# Patient Record
Sex: MALE | HISPANIC OR LATINO | Employment: FULL TIME | URBAN - METROPOLITAN AREA
[De-identification: names, ages, dates, MRNs, and addresses within clinical notes are randomized per-mention and may not be internally consistent; named-entity substitution may affect disease eponyms.]

---

## 2019-01-21 ENCOUNTER — APPOINTMENT (OUTPATIENT)
Dept: RADIOLOGY | Facility: MEDICAL CENTER | Age: 44
End: 2019-01-21
Attending: EMERGENCY MEDICINE
Payer: COMMERCIAL

## 2019-01-21 ENCOUNTER — HOSPITAL ENCOUNTER (EMERGENCY)
Facility: MEDICAL CENTER | Age: 44
End: 2019-01-21
Attending: EMERGENCY MEDICINE
Payer: COMMERCIAL

## 2019-01-21 VITALS
OXYGEN SATURATION: 98 % | TEMPERATURE: 97.9 F | DIASTOLIC BLOOD PRESSURE: 88 MMHG | BODY MASS INDEX: 27.4 KG/M2 | WEIGHT: 174.6 LBS | HEIGHT: 67 IN | RESPIRATION RATE: 16 BRPM | SYSTOLIC BLOOD PRESSURE: 136 MMHG | HEART RATE: 88 BPM

## 2019-01-21 DIAGNOSIS — S09.90XA CLOSED HEAD INJURY, INITIAL ENCOUNTER: ICD-10-CM

## 2019-01-21 DIAGNOSIS — S20.212S: ICD-10-CM
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
## 2019-01-21 LAB
ALBUMIN SERPL BCP-MCNC: 5.2 G/DL (ref 3.2–4.9)
ALBUMIN/GLOB SERPL: 1.7 G/DL
ALP SERPL-CCNC: 76 U/L (ref 30–99)
ALT SERPL-CCNC: 29 U/L (ref 2–50)
ANION GAP SERPL CALC-SCNC: 9 MMOL/L (ref 0–11.9)
AST SERPL-CCNC: 21 U/L (ref 12–45)
BASOPHILS # BLD AUTO: 0.6 % (ref 0–1.8)
BASOPHILS # BLD: 0.05 K/UL (ref 0–0.12)
BILIRUB SERPL-MCNC: 0.7 MG/DL (ref 0.1–1.5)
BUN SERPL-MCNC: 15 MG/DL (ref 8–22)
CALCIUM SERPL-MCNC: 9.8 MG/DL (ref 8.5–10.5)
CHLORIDE SERPL-SCNC: 105 MMOL/L (ref 96–112)
CO2 SERPL-SCNC: 26 MMOL/L (ref 20–33)
CREAT SERPL-MCNC: 1 MG/DL (ref 0.5–1.4)
EOSINOPHIL # BLD AUTO: 0.08 K/UL (ref 0–0.51)
EOSINOPHIL NFR BLD: 1 % (ref 0–6.9)
ERYTHROCYTE [DISTWIDTH] IN BLOOD BY AUTOMATED COUNT: 39.5 FL (ref 35.9–50)
GLOBULIN SER CALC-MCNC: 3 G/DL (ref 1.9–3.5)
GLUCOSE SERPL-MCNC: 86 MG/DL (ref 65–99)
HCT VFR BLD AUTO: 48.9 % (ref 42–52)
HGB BLD-MCNC: 17.1 G/DL (ref 14–18)
IMM GRANULOCYTES # BLD AUTO: 0.01 K/UL (ref 0–0.11)
IMM GRANULOCYTES NFR BLD AUTO: 0.1 % (ref 0–0.9)
LYMPHOCYTES # BLD AUTO: 2.01 K/UL (ref 1–4.8)
LYMPHOCYTES NFR BLD: 24 % (ref 22–41)
MCH RBC QN AUTO: 31.3 PG (ref 27–33)
MCHC RBC AUTO-ENTMCNC: 35 G/DL (ref 33.7–35.3)
MCV RBC AUTO: 89.4 FL (ref 81.4–97.8)
MONOCYTES # BLD AUTO: 0.56 K/UL (ref 0–0.85)
MONOCYTES NFR BLD AUTO: 6.7 % (ref 0–13.4)
NEUTROPHILS # BLD AUTO: 5.66 K/UL (ref 1.82–7.42)
NEUTROPHILS NFR BLD: 67.6 % (ref 44–72)
NRBC # BLD AUTO: 0 K/UL
NRBC BLD-RTO: 0 /100 WBC
PLATELET # BLD AUTO: 204 K/UL (ref 164–446)
PMV BLD AUTO: 11.1 FL (ref 9–12.9)
POTASSIUM SERPL-SCNC: 3.4 MMOL/L (ref 3.6–5.5)
PROT SERPL-MCNC: 8.2 G/DL (ref 6–8.2)
RBC # BLD AUTO: 5.47 M/UL (ref 4.7–6.1)
SODIUM SERPL-SCNC: 140 MMOL/L (ref 135–145)
WBC # BLD AUTO: 8.4 K/UL (ref 4.8–10.8)

## 2019-01-21 PROCEDURE — 36415 COLL VENOUS BLD VENIPUNCTURE: CPT

## 2019-01-21 PROCEDURE — 99285 EMERGENCY DEPT VISIT HI MDM: CPT

## 2019-01-21 PROCEDURE — 70450 CT HEAD/BRAIN W/O DYE: CPT

## 2019-01-21 PROCEDURE — 74177 CT ABD & PELVIS W/CONTRAST: CPT

## 2019-01-21 PROCEDURE — 71101 X-RAY EXAM UNILAT RIBS/CHEST: CPT | Mod: LT

## 2019-01-21 PROCEDURE — 700117 HCHG RX CONTRAST REV CODE 255: Performed by: EMERGENCY MEDICINE

## 2019-01-21 PROCEDURE — 80053 COMPREHEN METABOLIC PANEL: CPT

## 2019-01-21 PROCEDURE — 85025 COMPLETE CBC W/AUTO DIFF WBC: CPT

## 2019-01-21 RX ORDER — NAPROXEN SODIUM 275 MG/1
275 TABLET ORAL
Qty: 20 TAB | Refills: 1 | Status: SHIPPED | OUTPATIENT
Start: 2019-01-21 | End: 2023-11-02

## 2019-01-21 RX ORDER — CYCLOBENZAPRINE HCL 10 MG
10 TABLET ORAL 3 TIMES DAILY PRN
Qty: 30 TAB | Refills: 0 | Status: SHIPPED | OUTPATIENT
Start: 2019-01-21 | End: 2023-11-02

## 2019-01-21 RX ADMIN — IOHEXOL 100 ML: 350 INJECTION, SOLUTION INTRAVENOUS at 15:23
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
## 2019-01-22 NOTE — ED NOTES
Pt verbalizes understanding of discharge and follow-up instructions.  PIV removed.  Given Rx X2.  VSS.  All questions answered.  Ambulates to discharge with steady gait.

## 2023-11-02 ENCOUNTER — OFFICE VISIT (OUTPATIENT)
Dept: MEDICAL GROUP | Facility: CLINIC | Age: 48
End: 2023-11-02

## 2023-11-02 VITALS
BODY MASS INDEX: 26.36 KG/M2 | RESPIRATION RATE: 16 BRPM | WEIGHT: 178 LBS | DIASTOLIC BLOOD PRESSURE: 90 MMHG | HEIGHT: 69 IN | SYSTOLIC BLOOD PRESSURE: 136 MMHG | OXYGEN SATURATION: 94 % | HEART RATE: 72 BPM

## 2023-11-02 DIAGNOSIS — Z30.09 ENCOUNTER FOR VASECTOMY COUNSELING: ICD-10-CM

## 2023-11-02 DIAGNOSIS — R03.0 ELEVATED BLOOD PRESSURE READING: ICD-10-CM

## 2023-11-02 PROCEDURE — 99999 PR NO CHARGE: CPT | Performed by: STUDENT IN AN ORGANIZED HEALTH CARE EDUCATION/TRAINING PROGRAM

## 2023-11-02 PROCEDURE — 3075F SYST BP GE 130 - 139MM HG: CPT | Performed by: STUDENT IN AN ORGANIZED HEALTH CARE EDUCATION/TRAINING PROGRAM

## 2023-11-02 PROCEDURE — 3080F DIAST BP >= 90 MM HG: CPT | Performed by: STUDENT IN AN ORGANIZED HEALTH CARE EDUCATION/TRAINING PROGRAM

## 2023-11-02 RX ORDER — DIAZEPAM 2 MG/1
2 TABLET ORAL
Qty: 1 TABLET | Refills: 0 | Status: CANCELLED | OUTPATIENT
Start: 2023-11-02 | End: 2023-11-02

## 2023-11-02 ASSESSMENT — PATIENT HEALTH QUESTIONNAIRE - PHQ9: CLINICAL INTERPRETATION OF PHQ2 SCORE: 0

## 2023-11-02 NOTE — PROGRESS NOTES
"[facility] FAMILY MEDICINE OFFICE VISIT    Date: 11/2/2023    MRN: [id number]  Patient ID: Alejandro Foreman    SUBJECTIVE:  Alejandro Foreman is a 48 y.o. Male here for preoperative cystectomy counseling.  Patient states that he does not wish to have any further children.  Presently has 4, ages ranging 20-8.    Patient denies any known medical conditions.  Does not take any medications.  Did have inguinal hernia repair many years ago.  Notes that he frequently seems to have itching within his testicles, which he attributes to his past hernia repair with mesh.    PMHx/PSHx:  History reviewed. No pertinent past medical history.  Past Surgical History:   Procedure Laterality Date    HERNIA REPAIR         Allergies: Patient has no known allergies.    OBJECTIVE:  Vitals:    11/02/23 1653   BP: (!) 136/90   Pulse:    Resp:    SpO2:      Vitals:    11/02/23 1622 11/02/23 1653   BP: (!) 144/94 (!) 136/90   Weight: 80.7 kg (178 lb)    Height: 1.753 m (5' 9\")        Physical Examination:  General: Well appearing male in no acute distress, resting on arrival to room  HEENT: Normocephalic, atraumatic, EOMI  Cardiovascular: RRR, no murmurs, gallops, or rubs  Pulmonary: CTAB, symmetrical chest expansion, no rales, rhonchi, or wheezes  Genitourinary: Patient declined chaperone, normal-appearing external male genitalia, palpable vas deferens and descended testes bilaterally, no palpable other masses  Extremities: Moves all spontaneously  Neurological: Alert and oriented    ASSESSMENT & PLAN:  Alejandro Foreman is a 48 y.o. male here for preoperative vasectomy counseling, noted to have elevated blood pressure reading.    1. Encounter for vasectomy counseling        2. Elevated blood pressure reading            No orders of the defined types were placed in this encounter.      #Encounter for vasectomy counseling  Discussed procedure in length, including preparation for the procedure, day of care, procedure " course, risks/benefits/alternatives to procedure, postprocedure care, need for follow-up with confirmatory testing to confirm infertility approximately 2 months after procedure is finished.  Prior history of wire mesh placement for inguinal hernia felt unlikely to result in significant difficulties during this procedure.  Patient to follow-up once procedure scheduled in this office.  We will also plan to send Valium 2 mg oral once to be taken 20 minutes prior to procedure to pharmacy of choice.    #Elevated blood pressure reading  Multiple elevated blood pressures noted during today's encounter.  Patient has not established with a doctor outside of this visit.  Advised patient to begin checking blood pressure at least once daily, and to track via home blood pressure log.  Advised patient to schedule follow-up in approximately 2 weeks for review of home blood pressure logs, as well as to establish with primary care provider.  Patient verbalized understanding.    Iam Wolfe M.D.

## 2023-11-06 DIAGNOSIS — Z30.09 ENCOUNTER FOR VASECTOMY COUNSELING: ICD-10-CM

## 2023-11-06 RX ORDER — DIAZEPAM 2 MG/1
2 TABLET ORAL
Qty: 1 TABLET | Refills: 0 | Status: SHIPPED | OUTPATIENT
Start: 2023-11-06 | End: 2023-11-06

## 2023-12-05 ENCOUNTER — HOSPITAL ENCOUNTER (OUTPATIENT)
Facility: MEDICAL CENTER | Age: 48
End: 2023-12-05
Attending: STUDENT IN AN ORGANIZED HEALTH CARE EDUCATION/TRAINING PROGRAM
Payer: COMMERCIAL

## 2023-12-05 ENCOUNTER — OFFICE VISIT (OUTPATIENT)
Dept: MEDICAL GROUP | Facility: CLINIC | Age: 48
End: 2023-12-05

## 2023-12-05 VITALS
OXYGEN SATURATION: 97 % | WEIGHT: 178 LBS | DIASTOLIC BLOOD PRESSURE: 78 MMHG | SYSTOLIC BLOOD PRESSURE: 124 MMHG | HEIGHT: 68 IN | BODY MASS INDEX: 26.98 KG/M2 | TEMPERATURE: 98 F | HEART RATE: 78 BPM

## 2023-12-05 DIAGNOSIS — Z30.2 ENCOUNTER FOR VASECTOMY: ICD-10-CM

## 2023-12-05 PROCEDURE — 3074F SYST BP LT 130 MM HG: CPT | Performed by: STUDENT IN AN ORGANIZED HEALTH CARE EDUCATION/TRAINING PROGRAM

## 2023-12-05 PROCEDURE — 3078F DIAST BP <80 MM HG: CPT | Performed by: STUDENT IN AN ORGANIZED HEALTH CARE EDUCATION/TRAINING PROGRAM

## 2023-12-05 PROCEDURE — 55250 REMOVAL OF SPERM DUCT(S): CPT | Performed by: STUDENT IN AN ORGANIZED HEALTH CARE EDUCATION/TRAINING PROGRAM

## 2023-12-05 PROCEDURE — 99999 PR NO CHARGE: CPT | Performed by: STUDENT IN AN ORGANIZED HEALTH CARE EDUCATION/TRAINING PROGRAM

## 2023-12-05 PROCEDURE — 88302 TISSUE EXAM BY PATHOLOGIST: CPT

## 2023-12-05 RX ORDER — DIAZEPAM 2 MG/1
TABLET ORAL
COMMUNITY
Start: 2023-11-06

## 2023-12-06 NOTE — PROCEDURES
Vasectomy Procedure Note  PRE-OP DIAGNOSIS: Desires Elective Sterilization   POST-OP DIAGNOSIS: Same   PROCEDURE: Elective Bilateral Vasectomy   Performing Physician: Iam Wolfe M.D.  ANESTHESIA: (select one) Lidocaine 1%   Total amount used: 15 mL     INDICATIONS:     This gentleman desires elective sterilization. He was counseled  regarding the risks, alternatives, and benefits of male sterilization by  vasectomy. He was informed of the risks of the procedure, including but  not limited to failure of the procedure to produce sterility, the risks  of bleeding, infection, and injury to scrotal contents. All questions were answered in the pre-vasectomy conference and the required form was signed. No guarantees were given or implied.       PROCEDURE:     The patient was laid supine on the procedure table. He was sterilely  prepped and draped in the usual fashion. The vasa were identified  bilaterally. The left vas was grasped using the three-finger technique.  Local anesthesia with a 27 gauge needle was applied to the skin in the midline scrotum and to the left vas and  surrounding tissue. A vas fixing forcep was used to grasp the vas  through the scrotal skin. A vas dissecting instrument was then used to  mejia the skin and down through the fascia. The vas was then identified  and delivered through the incision. The surrounding vassal tissue was  incised in the midline in a vertical fashion to reveal the vas. The vas  was grasped with a vas forcep and delivered out of the fascia. The vas  was distally and proximally grasped.     The intervening segment of approximately 2 cm was excised  and held for pathologic review. The lumen of the vas were sealed with  thermal fine wire cautery. Surgical clip was placed on the  proximal ends of the vas.     The right vas was attended to in the same fashion as the left vas after  local anesthesia was applied to the vas and surrounding tissue. During procedure, slow bleeding  was noted adjacent to the distal vas deferens. Two 3-0 Vicryl simple sutures were used to control bleeding. The distal vas deferens was also included in these sutures. Subsequently no surgical clip was placed over the proximal vas.     All bleeding appeared well controlled. The scrotal fascia was allowed to close by primary intention. Sterile dressings were applied and the patient was sent home with standard post-vasectomy instructions, including to apply ice and take NSAIDs immediately upon returning home for pain control. Strict precautions were discussed. The patient was scheduled for follow-up in 7 days for recheck and to order laboratory testing to confirm sterility. Need for alternative form of contraception prior to definitive lab testing was discussed. Patient and family verbalized understanding and agreement with plan of care.

## 2023-12-06 NOTE — PROGRESS NOTES
[facility]- [facility]     NAME: Alejandro Foreman  MRN: [id number]    DATE OF SERVICE: 12/5/2023    Patient here for scheduled vasectomy.  Preoperative risks and complications of procedure discussed prior to further performing procedure.  Postoperative care explained as well.  Please see separate procedure note on today's date for details.    Iam Wolfe M.D.

## 2023-12-13 ENCOUNTER — OFFICE VISIT (OUTPATIENT)
Dept: MEDICAL GROUP | Facility: CLINIC | Age: 48
End: 2023-12-13

## 2023-12-13 VITALS
TEMPERATURE: 98.5 F | WEIGHT: 180 LBS | OXYGEN SATURATION: 97 % | DIASTOLIC BLOOD PRESSURE: 94 MMHG | BODY MASS INDEX: 26.66 KG/M2 | HEIGHT: 69 IN | HEART RATE: 65 BPM | SYSTOLIC BLOOD PRESSURE: 142 MMHG

## 2023-12-13 DIAGNOSIS — Z98.52 STATUS POST VASECTOMY: ICD-10-CM

## 2023-12-13 PROCEDURE — 99024 POSTOP FOLLOW-UP VISIT: CPT | Performed by: STUDENT IN AN ORGANIZED HEALTH CARE EDUCATION/TRAINING PROGRAM

## 2023-12-13 PROCEDURE — 3080F DIAST BP >= 90 MM HG: CPT | Performed by: STUDENT IN AN ORGANIZED HEALTH CARE EDUCATION/TRAINING PROGRAM

## 2023-12-13 PROCEDURE — 3077F SYST BP >= 140 MM HG: CPT | Performed by: STUDENT IN AN ORGANIZED HEALTH CARE EDUCATION/TRAINING PROGRAM

## 2023-12-13 NOTE — LETTER
December 13, 2023    To whom it may concern,    RE: Alejandro Carli Foreman    Alejandro was present for his scheduled appointment on 12/13/2023.  For medical reasons, please excuse him for any missed work on the dates of 12/12/2023 through 12/13/2023.    If you have any questions or concerns, please don't hesitate to call.        Sincerely,        Iam Wolfe M.D.    Electronically Signed

## 2023-12-13 NOTE — PROGRESS NOTES
"[facility] OFFICE VISIT    Date: 12/13/2023    MRN: [id number]  Patient ID: Alejandro Foreman    SUBJECTIVE:  Alejandro Foreman is a 48 y.o. male here postoperatively for vasectomy follow-up.  Patient reports that his pain has generally gotten better.  Patient reports that he did have substantial testicular swelling for 3 days after surgery, but this has been gradually improving.  Patient is presently not taking anything for pain.  Wishes to return to work.    PMHx/PSHx:  No past medical history on file.  Patient Active Problem List   Diagnosis    Elevated blood pressure reading     Past Surgical History:   Procedure Laterality Date    HERNIA REPAIR         Allergies: Patient has no known allergies.    OBJECTIVE:  Vitals:    12/13/23 0858   BP: (!) 142/94   Pulse: 65   Temp: 36.9 °C (98.5 °F)   SpO2: 97%     Vitals:    12/13/23 0858   BP: (!) 142/94   Weight: 81.6 kg (180 lb)   Height: 1.753 m (5' 9\")       Physical Examination:  General: Well appearing male in no acute distress, resting on arrival to room  HEENT: Normocephalic, atraumatic, EOMI  Cardiovascular: Skin pink, no acrocyanosis  Pulmonary: No tachypnea or retractions  Genitourinary: Patient declined chaperone at this time, healing central incision, trace bilateral swelling within the scrotum, mild tenderness to palpation superior to the right testicle without mass  Extremities: Moves all spontaneously  Neurological: Alert and oriented    ASSESSMENT & PLAN:   Alejandro Foreman is a 48 y.o. male here for postvasectomy follow-up as discussed below.    1. Status post vasectomy  SEMEN ANALYSIS,POSTVASECTOMY          Orders Placed This Encounter    SEMEN ANALYSIS,POSTVASECTOMY       # Status post vasectomy  Patient found to be doing well at this time.  Mild swelling on examination and tenderness to palpation consistent with postoperative change.  Discussed with patient that this should gradually resolve.  " Advised patient to take at least 3 more days of ibuprofen every 8 hours with food to expedite healing process.  Advised patient that he may begin taking baths or do other forms of deep water submersion in approximately 3 days.  Discussed with patient that it typically takes around 20-40 ejaculations and 1 to 2 months minimum in order for his body to clear all prior sperm from the vas deferens.  Discussed need for alternative form of contraception until patient is confirmed to be infertile.  At this time have ordered semen analysis to be completed in approximately 2 months.  Advised patient that physician will contact him with all lab results within a week of having a test performed, and to contact the office if he does not hear back in results during that time.  Patient verbalized agreement and understanding with plan of care.    Iam Wolfe M.D.

## 2024-03-15 ENCOUNTER — HOSPITAL ENCOUNTER (OUTPATIENT)
Facility: MEDICAL CENTER | Age: 49
End: 2024-03-15
Attending: STUDENT IN AN ORGANIZED HEALTH CARE EDUCATION/TRAINING PROGRAM
Payer: COMMERCIAL

## 2024-03-15 DIAGNOSIS — Z98.52 STATUS POST VASECTOMY: ICD-10-CM

## 2024-03-15 PROCEDURE — 89321 SEMEN ANAL SPERM DETECTION: CPT

## 2024-03-18 ENCOUNTER — TELEPHONE (OUTPATIENT)
Dept: MEDICAL GROUP | Facility: CLINIC | Age: 49
End: 2024-03-18
Payer: COMMERCIAL

## 2024-03-18 DIAGNOSIS — Z98.52 STATUS POST VASECTOMY: ICD-10-CM

## 2024-03-18 LAB
SPECIMEN VOL SMN: 1.5 ML
SPERM P VAS SMN QL MICRO: PRESENT

## 2024-03-18 NOTE — TELEPHONE ENCOUNTER
[facility]   TELEPHONE NOTE    NAME: Alejandro Foreman  MRN: [id number]    DATE OF SERVICE: 3/18/24    Called Alejandro at telephone number listed to discuss results of semen analysis, demonstrating persistent sperm within semen sample.  Discussed that we will plan to wait additional time to see whether this results in negative test, as patient may need additional time to work prior sperm through his vas deferens system.  At this time have ordered additional postvasectomy semen sample.  Advised patient to have this performed in approximately 1 month.  Patient verbalized understanding and agreement with plan of care.    Iam Wolfe M.D.

## 2024-03-26 ENCOUNTER — TELEPHONE (OUTPATIENT)
Dept: MEDICAL GROUP | Facility: CLINIC | Age: 49
End: 2024-03-26
Payer: COMMERCIAL

## 2024-03-26 NOTE — TELEPHONE ENCOUNTER
Phone Number Called: [phone] (home)       Call outcome: Spoke to patient regarding message below.    Message: lab order,I have called the patient to check and see if has received his lab slip that Dr. Wolfe send in he stated he hasn't I will print and mail it out to him.

## 2024-03-26 NOTE — TELEPHONE ENCOUNTER
----- Message from Iam Wolfe M.D. sent at 3/18/2024  2:16 PM PDT -----  Regarding: Send New Lab Slip  Hello,    Can we email this patient's new lab slip to him? He needs to repeat test in 1 month. I discussed with him over the phone.    Thanks!    Iam Wolfe M.D.    ----- Message -----  From: Lauren, Lab  Sent: 3/18/2024   2:05 PM PDT  To: Iam Wolfe M.D.

## 2024-08-01 ENCOUNTER — HOSPITAL ENCOUNTER (OUTPATIENT)
Facility: MEDICAL CENTER | Age: 49
End: 2024-08-01
Attending: STUDENT IN AN ORGANIZED HEALTH CARE EDUCATION/TRAINING PROGRAM
Payer: COMMERCIAL

## 2024-08-01 DIAGNOSIS — Z98.52 STATUS POST VASECTOMY: ICD-10-CM

## 2024-08-01 PROCEDURE — 89321 SEMEN ANAL SPERM DETECTION: CPT

## 2024-08-02 LAB
SPECIMEN VOL SMN: 0.5 ML
SPERM P VAS SMN QL MICRO: PRESENT

## 2024-08-05 ENCOUNTER — HOSPITAL ENCOUNTER (OUTPATIENT)
Facility: MEDICAL CENTER | Age: 49
End: 2024-08-05
Attending: STUDENT IN AN ORGANIZED HEALTH CARE EDUCATION/TRAINING PROGRAM
Payer: COMMERCIAL

## 2024-08-05 ENCOUNTER — TELEPHONE (OUTPATIENT)
Dept: MEDICAL GROUP | Facility: CLINIC | Age: 49
End: 2024-08-05
Payer: COMMERCIAL

## 2024-08-05 DIAGNOSIS — Z98.52 STATUS POST VASECTOMY: ICD-10-CM

## 2024-08-05 NOTE — TELEPHONE ENCOUNTER
[facility]- [facility]   TELEPHONE NOTE    NAME: Alejandro Foreman  MRN: [id number]    DATE OF SERVICE: 8/5/24    Contacted Alejandro by telephone number listed to discuss results of sperm sample, demonstrating persistently positive sperm.  Patient is now 8 months out from his vasectomy.  Discussed with patient that this is very unusual, and would indicate either that his vas deferens ends have recannulized or that perhaps original procedure was not successful as originally thought.  Advised patient that at this time we will send his vas deferens samples to lab to determine whether these indeed represent expected tissue types.  Advised patient that pending results of study, may need referral to urologist for revision at that time.  Patient would prefer to wait for the study results to come back before referral to the urologist.  Patient verbalized understanding and agreement with plan of care.    Iam Wolfe M.D.

## 2024-08-06 LAB — PATHOLOGY CONSULT NOTE: NORMAL

## 2024-08-07 PROCEDURE — 88302 TISSUE EXAM BY PATHOLOGIST: CPT

## 2024-08-19 ENCOUNTER — TELEPHONE (OUTPATIENT)
Dept: MEDICAL GROUP | Facility: CLINIC | Age: 49
End: 2024-08-19
Payer: COMMERCIAL

## 2024-08-19 DIAGNOSIS — Z98.52 STATUS POST VASECTOMY: ICD-10-CM

## 2024-08-19 NOTE — TELEPHONE ENCOUNTER
[facility]- [facility]   TELEPHONE NOTE    NAME: Alejandro Foreman  MRN: [id number]    DATE OF SERVICE: 8/19/24    Contacted Alejandro at telephone number listed to discuss results of pathology, which demonstrated complete cross-sections of bilateral vas deferens.  Discussed with patient this indicates successful procedure previously, though patient continues to have persistent positive semen analyses.  Discussed with patient that after speaking with another physician about this case, there is some concern that laboratory technician may be an experienced, resulting in a false positive result.  At this time we will order another sample study requesting that an experienced laboratory technician be the one to review the sample and provide report.  If this continues to be positive, we will then plan to reflex to a quantitative study, which will require patient to be seen at an outside lab.  Patient verbalized understanding and agreement with plan of care.    Iam Wolfe M.D.

## 2024-09-30 ENCOUNTER — HOSPITAL ENCOUNTER (OUTPATIENT)
Facility: MEDICAL CENTER | Age: 49
End: 2024-09-30
Attending: STUDENT IN AN ORGANIZED HEALTH CARE EDUCATION/TRAINING PROGRAM

## 2024-09-30 DIAGNOSIS — Z98.52 STATUS POST VASECTOMY: ICD-10-CM

## 2024-09-30 PROCEDURE — 89321 SEMEN ANAL SPERM DETECTION: CPT

## 2024-10-10 LAB
SPECIMEN VOL SMN: 1.5 ML
SPERM P VAS SMN QL MICRO: PRESENT

## 2024-10-11 ENCOUNTER — TELEPHONE (OUTPATIENT)
Dept: MEDICAL GROUP | Facility: CLINIC | Age: 49
End: 2024-10-11

## 2024-10-11 DIAGNOSIS — Z30.2 ENCOUNTER FOR VASECTOMY: ICD-10-CM
